# Patient Record
(demographics unavailable — no encounter records)

---

## 2025-01-14 NOTE — ASSESSMENT
[FreeTextEntry1] : 47 year old male with type 1 DM, Hashimoto's thyroiditis, a small right thyroid nodule and hyperlipidemia here for follow up. His diabetes is well controlled.  1. Type 1 DM-    Obtain recent lab report.    Continue current insulin pump settings.   Upgrade to T-slim X2 with Dexcom G7 for more accurate CGM.   2. Hashimoto's-   Follow TFTs every 6 months.     3. thyroid nodule- Close follow up not necessary due to small size. 4. Hyperlipidemia-  Continue Atorvastatin.    5.  HTN-  continue lisinopril.  Follow up in 6 months.

## 2025-01-14 NOTE — DATA REVIEWED
[FreeTextEntry1] : Thyroid US 3/4/2021:  In the right mid pole there is a  hypoechoic nodule. It measures 0.4 x 0.3 x 0.4 cm. It is stable when compared to report from 2017.  There is also a 0.6 x 0.4 x 0.6 cm hypoechoic nodule inferior to the left lower pole of the thyroid, which likely represents a lymph node.   Thyroid US: 5/17/2017:  right mid pole 0.4 x 0.3 x 0.3 cm hypoechoic nodule (stable since 2016), heterogeneous gland.

## 2025-01-14 NOTE — HISTORY OF PRESENT ILLNESS
[FreeTextEntry1] : Follow up of type 1 DM, Hashimoto's Thyroiditis, thyroid nodule, hyperlipidemia and vitamin D deficiency  Quality:  Type 1 DM Severity:  well controlled Duration of diabetes:  since age 17 Associated Complications/ Symptoms:  hypoglycemic unawareness, neuropathy Modifying Factors:  Better with insulin pump  SMBG:  testing 8 times a day.    Currently using Guardian CGM.    reviewed CGM:  average BG is 138 mg/dl with CV of 34%.. 77% of BG within target range, 5% below range and 18% above range.      Current Diabetic Medication Regimen: Humalog in Med[employer] 770G.    T-slim was recently ordered for patient (current pump is now out of warranty).     Labs recently done at [facility] showing A1c of 6.4%.  LDL is 85.